# Patient Record
(demographics unavailable — no encounter records)

---

## 2017-01-10 NOTE — PHYS DOC
Past Medical History


Past Medical History:  No Pertinent History


Past Surgical History:  No Surgical History


Additional Information:  


no 2nd hand smoke exposure


Alcohol Use:  None


Drug Use:  None





Adult General


Chief Complaint


Chief Complaint:  COUGH





HPI


HPI


Patient is a 11  year old male who presents with URI symptoms for 3 days. 

Reports nonproductive cough, nasal congestion, and sore throat. He denies 

difficulty breathing, ear pain, or fevers. His sister is also ill with similar 

symptoms. The patient's immunizations are up-to-date. He does not currently 

have a PCP.





Review of Systems


Review of Systems


Constitutional: Denies fever or chills. []


Eyes: Denies change in visual acuity, redness, or eye pain. []


HENT: Denies ear pain. Reports nasal congestion and sore throat.


Respiratory: Denies shortness of breath. Reports nonproductive cough.


Musculoskeletal: Denies back pain or joint pain. []


Integument: Denies rash or skin lesions. []


Neurologic: Denies headache, focal weakness or sensory changes. []





All systems reviewed and negative unless otherwise stated in the HPI.





Allergies


Allergies





 Allergies








Coded Allergies Type Severity Reaction Last Updated Verified


 


  No Known Drug Allergies    11/1/16 No











Physical Exam


Physical Exam





Constitutional: Well developed, well nourished, no acute distress, non-toxic 

appearance. []


HENT: Normocephalic, atraumatic, bilateral external ears normal, oropharynx 

moist, no oral exudates, nose normal. Bilateral TMs without erythema or 

bulging. There is no posterior pharyngeal erythema or tonsillar edema. 

Bilateral nasal turbinates are mildly swollen and erythematous.


Eyes: PERRLA, EOMI, conjunctiva normal, no discharge. [] 


Neck: Normal range of motion, no tenderness, supple, no stridor. [] 


Cardiovascular: Heart rate regular rhythm, no murmur []


Lungs & Thorax:  Bilateral breath sounds clear to auscultation without wheezes, 

rales, or rhonchi.


Skin: Warm, dry, no erythema, no rash. [] 


Neurologic: Alert and oriented X 3, normal motor function, normal sensory 

function, no focal deficits noted. []


Psychologic: Affect normal, judgement normal, mood normal. []





Current Patient Data


Vital Signs





 Vital Signs








  Date Time  Temp Pulse Resp B/P Pulse Ox O2 Delivery O2 Flow Rate FiO2


 


1/10/17 10:20 98.8  18  96   





 98.8       








Lab Values





 Laboratory Tests








Test


  1/10/17


10:45


 


Group A Streptococcus Rapid


  Negative


(NEGATIVE)











EKG


EKG


[]





Radiology/Procedures


Radiology/Procedures


[]





Course & Med Decision Making


Course & Med Decision Making


Pertinent Labs and Imaging studies reviewed. (See chart for details)





[]





Dragon Disclaimer


Dragon Disclaimer


This electronic medical record was generated, in whole or in part, using a 

voice recognition dictation system.





Departure


Departure


Impression:  


 Primary Impression:  


 URI (upper respiratory infection)


Disposition:  01 HOME, SELF-CARE


Condition:  STABLE


Referrals:  


NO PCP (PCP)


Patient Instructions:  Upper Respiratory Infection, Child, Easy-to-Read





Additional Instructions:


Your strep test was negative. It appears that you have a viral upper 

respiratory infection. Antibiotics do not help with this type of illness.


Please take Tylenol or ibuprofen for fever or pain.


Please drink lots of liquids and get plenty of rest.


Please follow-up with a primary care provider if your symptoms continue.


Return to the emergency department if you have any new or concerning symptoms.





Problem Qualifiers








 Primary Impression:  


 URI (upper respiratory infection)


 URI type:  unspecified viral URI  Qualified Code:  J06.9 - Acute upper 

respiratory infection, unspecified





MIKA BURNS Eugene 10, 2017 12:19

## 2017-04-04 NOTE — PHYS DOC
Past Medical History


Past Medical History:  No Pertinent History


Past Surgical History:  No Surgical History


Alcohol Use:  None


Drug Use:  None





General Pediatric Assessment


History of Present Illness


History of Present Illness





12-year-old male presents emergency department stating that last night he 

started having a sore throat with a cough congestion with headache above the 

forehead. Patient denies any fever, chills or any nausea or vomiting. He states 

to Tylenol for the pain and discomfort which seemed to have helped.





Review of Systems


Review of Systems





Constitutional: Denies fever or chills []


Eyes: Denies change in visual acuity, redness, or eye pain []


HENT:  nasal congestion denies sore throat []


Respiratory:  cough denies shortness of breath []


Cardiovascular: No additional information not addressed in HPI []


GI: Denies abdominal pain, nausea, vomiting, bloody stools or diarrhea []


: Denies dysuria or hematuria []


Musculoskeletal: Denies back pain or joint pain []


Integument: Denies rash or skin lesions []


Neurologic: Denies headache, focal weakness or sensory changes []





Allergies


Allergies





 Allergies








Coded Allergies Type Severity Reaction Last Updated Verified


 


  No Known Drug Allergies    11/1/16 No











Physical Exam


Physical Exam





Constitutional: Well developed, well nourished, no acute distress, non-toxic 

appearance, positive interaction, playful. []


HENT: Normocephalic, atraumatic, bilateral external ears normal, oropharynx 

moist, no oral exudates, nose normal. Vital tympanic membranes appear to be 

normal. Throat with erythematous noted. Bilateral nares appear to have clear 

drainage noted. Bilateral turbinates normal


Eyes: PERRLA, conjunctiva normal, no discharge. []


Neck: Normal range of motion, no tenderness, supple, no stridor. []


Cardiovascular: Normal heart rate, normal rhythm, no murmurs, no rubs, no 

gallops. []


Thorax and Lungs: Normal breath sounds, no respiratory distress, no wheezing, 

no chest tenderness, no retractions, no accessory muscle use. []


Skin: Warm, dry, no erythema, no rash. []


Back: No tenderness


Extremities: Intact distal pulses, no tenderness, no cyanosis, ROM intact, no 

edema, no deformities. [] 


Neurologic: Alert and interactive, normal motor function, normal sensory 

function, no focal deficits noted. []


Vital Signs





 Vital Signs








  Date Time  Temp Pulse Resp B/P Pulse Ox O2 Delivery O2 Flow Rate FiO2


 


4/4/17 11:00 98.1  20  98   





 98.1       











Radiology/Procedures


Radiology/Procedures


[]





Labs


Current Patient Data





 Laboratory Tests








Test


  4/4/17


11:05


 


Influenza Type A Antigen


  Negative


(NEGATIVE)


 


Influenza Type B Antigen


  Negative


(NEGATIVE)











Course & Med Decision Making


Course & Med Decision Making


Pertinent Labs and Imaging studies reviewed. (See chart for details)


Influenza swabs were negative. Patient has had no fever here in the emergency 

department. No cough or congestion noted at this time. Patient be recommended 

to use Mucinex DM over-the-counter for children. Tylenol or ibuprofen for fever 

chills or generalized body aches and discomfort. Patient will be discharged 

home in stable condition with recommendations to drink plenty of fluids. Since 

symptoms to return back to emergency department been provided. Parent agrees 

with discharge instructions treatment regimens and follow-up recommendations.





Laboratory


Lab Results





Laboratory Tests








Test


  4/4/17


11:05


 


Influenza Type A Antigen


  Negative


(NEGATIVE)


 


Influenza Type B Antigen


  Negative


(NEGATIVE)








Laboratory Tests








Test


  4/4/17


11:05


 


Influenza Type A Antigen


  Negative


(NEGATIVE)


 


Influenza Type B Antigen


  Negative


(NEGATIVE)











Dragon Disclaimer


Dragon Disclaimer


This electronic medical record was generated, in whole or in part, using a 

voice recognition dictation system.





Departure


Departure


Impression:  


 Primary Impression:  


 URI (upper respiratory infection)


Disposition:  01 HOME, SELF-CARE


Condition:  STABLE


Referrals:  


NO PCP (PCP)


Patient Instructions:  Upper Respiratory Infection, Child, Easy-to-Read





Additional Instructions:


Activity as tolerated.


Drink plenty of fluids.


Tylenol or ibuprofen for pain and discomfort.


He may also use Mucinex DM over-the-counter for children to help with 

congestion.


Follow-up primary care physician next 5-7 days.


Return back to emergency department for signs and symptoms of become worse.








SHAWN DAVIES Apr 4, 2017 12:07